# Patient Record
Sex: FEMALE | Race: WHITE | Employment: FULL TIME | URBAN - METROPOLITAN AREA
[De-identification: names, ages, dates, MRNs, and addresses within clinical notes are randomized per-mention and may not be internally consistent; named-entity substitution may affect disease eponyms.]

---

## 2017-11-30 ENCOUNTER — HOSPITAL ENCOUNTER (OUTPATIENT)
Age: 62
Setting detail: SPECIMEN
Discharge: HOME | End: 2017-11-30
Payer: COMMERCIAL

## 2017-12-04 LAB — DERMATOLOGY PATHOLOGY REPORT: NORMAL

## 2018-11-07 ENCOUNTER — HOSPITAL ENCOUNTER (OUTPATIENT)
Age: 63
Setting detail: OUTPATIENT SURGERY
Discharge: HOME | End: 2018-11-07
Attending: UROLOGY | Admitting: UROLOGY
Payer: COMMERCIAL

## 2018-11-07 VITALS
SYSTOLIC BLOOD PRESSURE: 148 MMHG | HEART RATE: 69 BPM | WEIGHT: 164 LBS | OXYGEN SATURATION: 99 % | BODY MASS INDEX: 28 KG/M2 | TEMPERATURE: 97.3 F | HEIGHT: 64 IN | DIASTOLIC BLOOD PRESSURE: 81 MMHG | RESPIRATION RATE: 20 BRPM

## 2018-11-07 PROCEDURE — 2580000003 HC RX 258: Performed by: UROLOGY

## 2018-11-07 PROCEDURE — 3600000012 HC SURGERY LEVEL 2 ADDTL 15MIN: Performed by: UROLOGY

## 2018-11-07 PROCEDURE — 2709999900 HC NON-CHARGEABLE SUPPLY: Performed by: UROLOGY

## 2018-11-07 PROCEDURE — 7100000011 HC PHASE II RECOVERY - ADDTL 15 MIN: Performed by: UROLOGY

## 2018-11-07 PROCEDURE — 7100000010 HC PHASE II RECOVERY - FIRST 15 MIN: Performed by: UROLOGY

## 2018-11-07 PROCEDURE — 3600000002 HC SURGERY LEVEL 2 BASE: Performed by: UROLOGY

## 2018-11-07 RX ORDER — MAGNESIUM HYDROXIDE 1200 MG/15ML
LIQUID ORAL CONTINUOUS PRN
Status: DISCONTINUED | OUTPATIENT
Start: 2018-11-07 | End: 2018-11-07 | Stop reason: HOSPADM

## 2018-11-07 RX ORDER — CITALOPRAM 10 MG/1
10 TABLET ORAL
COMMUNITY
Start: 2018-02-15

## 2018-11-07 RX ORDER — LORATADINE 10 MG/1
10 TABLET ORAL
COMMUNITY

## 2018-11-07 ASSESSMENT — PAIN - FUNCTIONAL ASSESSMENT: PAIN_FUNCTIONAL_ASSESSMENT: 0-10

## 2018-11-07 NOTE — OP NOTE
Dr. Yeison Phelps MD      [address] [facility]. [place]  11/07/18    Patient:  Valentino Calle  MRN: [id number]  YOB: 1955    Surgeon: Dr. Yeison Phelps MD  Assistant: Dr. Alexsandra Gamboa MD    Pre-op Diagnosis: Urinary Incontinence, Bladder Pain, History of Mid-Urethral Sling  Post-op Diagnosis: Same. No UDO, No Pelvic Floor Dysfunction, No FOREST on Cough/Valsalva. Procedure:   Complex CMG with Pressure/Flow Studies. Anesthesia: Local  Complications: None  OR Blood Loss:  Minimal  Fluids: Cystalloids  Specimens: None    Indication:  The patient is a 66year old female with history of Urinary Incontinence, Bladder Pain, and Mid-Urethral Sling placement by Dr James Kennedy. She was experiencing urinary incontinence and was unclear if it was stress or urge related. To test her bladder dynamics she was brought back to the OR today for a urodynamics study. Narrative of the Procedure: The patient was brought back to the operating room. She was sterilely prepared. A rectal, bladder, and EMG probes were placed. This point we began instilling the bladder with normal saline at a rate of 30 mL/m. The patient had a first sensation at roughly 80 mL. A secondary sensation around 130 mL. And normal sensation around 250, and a maximum capacity of around 550. Throughout the study there was no evidence of detrusor overactivity. Her detrusor pressure was completely quiet. We did ask her to cough as well as Valsalva. She was able to increase her bladder pressure to more than 100 cm of water. There is no evidence of any leakage. This was a normal filling phase study. We then asked the patient to urinate. She had difficulty initiating her urinary stream.  Once she did start it was somewhat staccato and however there was no evidence of any pelvic floor dysfunction. She was able to void with detrusor pressure. She was not voiding with Credé.   She was able to completely empty her

## 2018-11-07 NOTE — H&P
[facility], T[facility]razas[facility]   Urology H&P      Patient:  Cecilia Quintero  MRN: [id number]  YOB: 1955    CHIEF COMPLAINT:  Urinary incontinence    HISTORY OF PRESENT ILLNESS:   The patient is a 66 y.o. female who presents with urinary leakage. Here for video urodynamics. Patient's old records, notes and chart reviewed and summarized above. Past Medical History:    Past Medical History:   Diagnosis Date    Arthritis     Cancer [facility])        Past Surgical History:    Past Surgical History:   Procedure Laterality Date    HYSTERECTOMY      SKIN BIOPSY      TUBAL LIGATION         Medications:    No current facility-administered medications for this encounter. Allergies: Allergies   Allergen Reactions    Sulfa Antibiotics Hives    Codeine Nausea And Vomiting       Social History:   Social History     Social History    Marital status:      Spouse name: N/A    Number of children: N/A    Years of education: N/A     Occupational History    Not on file. Social History Main Topics    Smoking status: Former Smoker    Smokeless tobacco: Not on file    Alcohol use 1.2 oz/week     2 Shots of liquor per week      Comment: daily    Drug use: No    Sexual activity: Not on file     Other Topics Concern    Not on file     Social History Narrative    No narrative on file       Family History:  History reviewed. No pertinent family history. REVIEW OF SYSTEMS:  A comprehensive 14 point review of systems was obtained. Constitutional: No fatigue  Eyes: No blurry vision  Ears, nose, mouth, throat, face: No ringing in the ears; no facial droop. Respiratory: No cough or cold. Cardiovascular: No palpitations  Gastrointestinal: No diarrhea or constipation. Genitourinary: No burning with urination  Integument/Skin: No rashes  Hematologic/Lymphatic: No easy bruising  Musculoskeletal: No muscle pains  Neurologic: No weakness in the extremities.

## 2023-01-03 ENCOUNTER — HOSPITAL ENCOUNTER (OUTPATIENT)
Age: 68
Setting detail: SPECIMEN
Discharge: HOME | End: 2023-01-03

## 2023-01-05 LAB — DERMATOLOGY PATHOLOGY REPORT: NORMAL

## 2024-06-18 ENCOUNTER — OFFICE VISIT (OUTPATIENT)
Age: 69
End: 2024-06-18
Payer: MEDICARE

## 2024-06-18 VITALS
SYSTOLIC BLOOD PRESSURE: 136 MMHG | DIASTOLIC BLOOD PRESSURE: 88 MMHG | HEART RATE: 69 BPM | BODY MASS INDEX: 25.75 KG/M2 | WEIGHT: 150 LBS

## 2024-06-18 DIAGNOSIS — N81.9 VAGINAL VAULT PROLAPSE: ICD-10-CM

## 2024-06-18 DIAGNOSIS — N81.6 RECTOCELE: ICD-10-CM

## 2024-06-18 DIAGNOSIS — N39.41 URGE INCONTINENCE OF URINE: Primary | ICD-10-CM

## 2024-06-18 DIAGNOSIS — N95.2 POSTMENOPAUSAL ATROPHIC VAGINITIS: ICD-10-CM

## 2024-06-18 DIAGNOSIS — R39.15 URINARY URGENCY: ICD-10-CM

## 2024-06-18 DIAGNOSIS — N81.10 BADEN-WALKER GRADE 1 CYSTOCELE: ICD-10-CM

## 2024-06-18 DIAGNOSIS — N39.0 RECURRENT UTI: ICD-10-CM

## 2024-06-18 DIAGNOSIS — R33.9 RETENTION OF URINE: ICD-10-CM

## 2024-06-18 DIAGNOSIS — N39.498 OTHER URINARY INCONTINENCE: ICD-10-CM

## 2024-06-18 DIAGNOSIS — N81.89 WEAKNESS OF PELVIC FLOOR: ICD-10-CM

## 2024-06-18 LAB
BILIRUBIN, POC: NORMAL
BLOOD URINE, POC: NORMAL
CLARITY, POC: CLEAR
COLOR, POC: YELLOW
GLUCOSE URINE, POC: NORMAL
KETONES, POC: NORMAL
LEUKOCYTE EST, POC: NORMAL
NITRITE, POC: NORMAL
PH, POC: 7
POST VOID RESIDUAL (PVR): NORMAL ML
PROTEIN, POC: NORMAL
SPECIFIC GRAVITY, POC: 1.01
UROBILINOGEN, POC: NORMAL

## 2024-06-18 PROCEDURE — 1123F ACP DISCUSS/DSCN MKR DOCD: CPT

## 2024-06-18 PROCEDURE — 81003 URINALYSIS AUTO W/O SCOPE: CPT

## 2024-06-18 PROCEDURE — 99204 OFFICE O/P NEW MOD 45 MIN: CPT

## 2024-06-18 PROCEDURE — 51798 US URINE CAPACITY MEASURE: CPT

## 2024-06-18 RX ORDER — ESTRADIOL 0.1 MG/G
CREAM VAGINAL
COMMUNITY

## 2024-06-18 RX ORDER — MULTIVIT-MIN/IRON/FOLIC ACID/K 18-600-40
CAPSULE ORAL
COMMUNITY

## 2024-06-18 RX ORDER — METOPROLOL SUCCINATE 25 MG/1
1 TABLET, EXTENDED RELEASE ORAL EVERY MORNING
COMMUNITY
Start: 2019-03-15

## 2024-06-18 RX ORDER — AMLODIPINE BESYLATE 2.5 MG/1
1 TABLET ORAL EVERY MORNING
COMMUNITY
Start: 2019-03-15

## 2024-06-18 ASSESSMENT — ENCOUNTER SYMPTOMS
DIARRHEA: 0
FACIAL SWELLING: 0
COUGH: 0
CONSTIPATION: 0
SHORTNESS OF BREATH: 0
CHEST TIGHTNESS: 0
VOICE CHANGE: 0
TROUBLE SWALLOWING: 0
VOMITING: 0
RECTAL PAIN: 0
NAUSEA: 0
ABDOMINAL PAIN: 0

## 2024-06-18 NOTE — PATIENT INSTRUCTIONS
Cystocele and Rectocele   Pronounced: [id number]-[id number] or [id number]     Definition   A cystocele occurs when part of the bladder wall bulges into the vagina. The bulge happens through a defect in the tissue between the bladder and vagina.     Cystocele        2011 [employer]   A rectocele occurs when part of the wall of the rectum bulges into the vagina. The bulge happens through a defect in the tissue between the rectum and vagina.     Rectocele        2011 [employer]   These form because of a problem with pelvic support tissues (eg, fascia, ligaments, and muscle).     Reasons for Procedure   These repairs are done to stop symptoms like problems going to the bathroom, urine leakage, or pain during sex. Most often, this type of surgery is not done until other treatments have been tried. Other treatments may include muscle exercises and the insertion of a pessary device (a device put into the vagina to try to push the bladder or rectum back into place). If you have tried these treatments and experienced no relief, your doctor may suggest surgical repair.     Possible Complications   Complications are rare, but no procedure is completely free of risk. If you are planning to have this type of repair, your doctor will review a list of possible complications, which may include:   Adverse reaction to anesthesia   Infection   Bleeding   Accidental damage to vagina, rectum, and bladder   Accidental damage to nearby organs     What to Expect   Prior to Procedure   Talk to your doctor about your current medicines. Certain medicines may need to be stopped before the procedure, such as:   Aspirin or other anti-inflammatory drugs for up to one week before surgery   Blood-thinning drugs like clopidogrel (Plavix) or warfarin (Coumadin)   Eat a light meal the evening before the surgery.   Do not have anything to eat or drink after midnight on the night before the procedure.   If you are having a

## 2024-06-18 NOTE — PROGRESS NOTES
[facility], [facility] UROGYNECOLOGY AND PELVIC REHABILITATION   [address] [place] [address]  Dept: [phone]  Date: 6/18/2024  Patient Name: Rosita Arora    VISIT - NEW PATIENT VISIT     CC: had concerns including New Patient (NEW PATIENT - Bladder Issues//). had concerns including New Patient (NEW PATIENT - Bladder Issues//).    Chaperone present: None Required    HPI: This is a 69 year old patient, here to establish care and discuss bladder issues and pelvic organ prolapse. She has known IC, reporting that her IC is well controlled, she knows her triggers. Dr. Gallardo, has been treating her IC. She has also noted that she has had previous recurrent culture proven UTIs \"for years\", treated with ATBs. States that she states that she has started to use Uqora, which has helped to prevent any further UTIs. She has reported that she has had previous UDS/Cysto one year ago, along with a CT urogram for sandee hematuria. She has not gone back to see Dr. Gallardo, she would like a second opinion. She reports these tests were negative. I did review her prior CT scan, and will scan into her record.   She reports that she did think the bleeding was coming vaginally (at that time, not from her urethra); she has had a prior hysterectomy. Denies any recent vaginal bleeding - last episode of urethral or vaginal bleeding noted was more than 1 year ago. Her last colonoscopy was 3 years ago, normal per patient. However, she has noted that she has seen intermittent bright red blood when having a bowel movement. She is planning on to call and discuss with her GI.   She does feel that she has pelvic organ prolapse. She states that she has a large bulge vaginally. She is sexually active, with one male partner. No concerns for STIs, no PCB. She does report discomfort during intercourse; r/t dryness or \"something in the way\". Uses estrace cream.   Denies any history of

## 2024-06-26 ENCOUNTER — OFFICE VISIT (OUTPATIENT)
Age: 69
End: 2024-06-26
Payer: MEDICARE

## 2024-06-26 DIAGNOSIS — N94.19 DYSPAREUNIA DUE TO MEDICAL CONDITION IN FEMALE: ICD-10-CM

## 2024-06-26 DIAGNOSIS — N81.10 BADEN-WALKER GRADE 1 CYSTOCELE: ICD-10-CM

## 2024-06-26 DIAGNOSIS — R39.15 URINARY URGENCY: ICD-10-CM

## 2024-06-26 DIAGNOSIS — N81.9 VAGINAL VAULT PROLAPSE: ICD-10-CM

## 2024-06-26 DIAGNOSIS — N81.6 RECTOCELE: Primary | ICD-10-CM

## 2024-06-26 DIAGNOSIS — N81.89 WEAKNESS OF PELVIC FLOOR: ICD-10-CM

## 2024-06-26 PROCEDURE — 97530 THERAPEUTIC ACTIVITIES: CPT

## 2024-06-26 PROCEDURE — 97161 PT EVAL LOW COMPLEX 20 MIN: CPT

## 2024-06-26 NOTE — PROGRESS NOTES
[facility] UROGYNECOLOGY AND PELVIC REHABILITATION   [address] [facility]  [address] [place] [address]     Physical Therapy Pelvic Floor Evaluation    Date:  2024  Patient: Rosita Arora  : 1955  MRN: [id number]  Physician:  Elli SNYDER    Insurance: [employer] Medicare   Diagnosis: No diagnosis found.      R39.15 (ICD-10-CM) - Urinary urgency   N39.41 (ICD-10-CM) - Urge incontinence of urine   N81.89 (ICD-10-CM) - Weakness of pelvic floor   N81.6 (ICD-10-CM) - Rectocele   N81.10 (ICD-10-CM) - [place]-Walker grade 1 cystocele     Onset Date: many years ago   Next  Appt: TBD  Visit# / total visits:     Cancels/No Shows: 0    Subjective:    Pain:  [x] Yes Vaginal canal with penetration -8/10    Likes to be called Constanza     Today, Rosita reports that she does have a rectocele. She has had a long historu of bladder issues as well.     Pt notices that she will have leakage post urination. She will often find her underwear damp at times but not feel leakage.    She does not wear a pad or panty liner.     She has been going on for about 5+ years. She does have a history of IC and knows her triggers. She does feel like it is really well managed overall.     She feels like she sits on the toilet for 30 min to 45 min to have a bowel movement. She reports that stool will be loose or diarrhea like.       She reports that she is getting a rectal prolapse where she will get a bulge of rectal tissue at her rectum.       She did a colonoscopy about 2 years ago and does have a history of hemorrhoids. She was told that since December she has had 3 episodes of rectal bleeding. '    She will occasionally have to take a colace, and is straining quite often to have a bowel movement.     When she does participate in intercourse, her  will state he feels like he cannot get it in deep because something is blocking him and she reports it is very

## 2024-06-26 NOTE — PATIENT INSTRUCTIONS
Introduction to Bowel Health  Diet and daily habits can help you predict when your bowels will move on a regular basis.  The consistency and quantity of the stool is usually more important than the frequency.  The goal is to have a regular bowel movement that is soft but formed.     Tips on Emptying Regularly  Eat breakfast.  Usually the best time of day for a bowel movement will be a half hour to an hour after eating.  These times are best because the body uses the gastrocolic reflex, a stimulation of bowel motion that occurs with eating, to help produce a bowel movement.  For some people even a simple hot drink in the morning can help the reflex action begin.  Eat all your meals at a predictable time each day.  The bowel functions best when food is introduced at the same regular intervals.  The amount of food eaten at a given time of day should be about the same size from day to day.  The bowel functions best when food is introduced in similar quantities from day to day. It is fine to have a small breakfast and a large lunch, or vice versa, just be consistent.  Eat two servings of fruit or vegetables and at least one serving of a complex carbohydrates (whole grains such as brown rice, bran, whole wheat bread, or oatmeal) at each meal.  Drink plenty of water--ideally eight glasses a day.  Be sure to increase your water intake if you are increasing fiber into your diet.    Maintain Healthy Habits  Exercise daily.  You may exercise at any time of day, but you may find that bowel function is helped most if the exercise is at a consistent time each day.  Make sure that you are not rushed and have convenient access to a bathroom at your selected time to empty your bowels.     **use a squatty potty or a stool when having a bowel movement. Try not to sit for longer than 5 min.     ** Try magnesium citrate or glycinate (calm gummies)       Bowel program:    Have breakfast at same time each day.  Chew and swallow your

## 2024-07-11 ENCOUNTER — EVALUATION (OUTPATIENT)
Age: 69
End: 2024-07-11

## 2024-07-11 DIAGNOSIS — N81.9 VAGINAL VAULT PROLAPSE: ICD-10-CM

## 2024-07-11 DIAGNOSIS — N81.10 BADEN-WALKER GRADE 1 CYSTOCELE: ICD-10-CM

## 2024-07-11 DIAGNOSIS — N81.89 WEAKNESS OF PELVIC FLOOR: ICD-10-CM

## 2024-07-11 DIAGNOSIS — R39.15 URINARY URGENCY: ICD-10-CM

## 2024-07-11 DIAGNOSIS — N81.6 RECTOCELE: Primary | ICD-10-CM

## 2024-07-11 NOTE — PROGRESS NOTES
[facility], [facility] UROGYNECOLOGY AND PELVIC REHABILITATION   [facility] [facility]  [address] [place] [address]  Dept: [phone]     Date of Visit: 2024   Patient: Rosita Arora   : 1955   Referring Physician: Elli SNYDER  Insurance: aetna Medicare    Visit#:  3   Visit Diagnoses:   Diagnosis Orders   1. Rectocele        2. Weakness of pelvic floor        3. [place]-Walker grade 1 cystocele        4. Vaginal vault prolapse        5. Urinary urgency          Chaperone for Intimate Exam  Chaperone was offered as part of the rooming process. Patient declined and agrees to continue with exam without a chaperone.         Subjective:  Rosita Arora  (: 1955  is a 69 y.o.  y.o. female ,Established patient.  Pt states she is voiding every 2-4 hours during the day depending on fluids, getting up 1-2 times at night, does drink fluids before bed and when she gets up in the middle of the night as well (will eliminate fluids 2 hours before bed and during the middle of the night).  Squatty potty has helped still soft and mushy stool just started taking MG glycinate on Monday this week no changes yet.  Pt states she does have pain with initial penetration (uses uberlube as well) and pain deep.          Objective:   Tightness and tenderness noted at bilateral OI region     Tightness and tension across bilateral lower abdomen     Treatment:  Manual Therapy:  45 MIN Internal MFR, MFR/Cupping to bilateral lower abdomen   There-Act:  15 MIN  Reviewed POC and HEP.  Pt aware of after care and possible side effects of Cupping     Assessment:  Pt had moderate tightness noted bilateral OI region  with left greater than right decreased after internal MFR.  Tightness and tenderness noted across bilateral lower abdomen  decreased after MFR/cupping. Pt is aware of the after care and possible side effects of cupping as well.  Pt will continue to benefit from

## 2024-07-24 ENCOUNTER — EVALUATION (OUTPATIENT)
Age: 69
End: 2024-07-24
Payer: MEDICARE

## 2024-07-24 DIAGNOSIS — N81.89 WEAKNESS OF PELVIC FLOOR: ICD-10-CM

## 2024-07-24 DIAGNOSIS — N81.9 VAGINAL VAULT PROLAPSE: ICD-10-CM

## 2024-07-24 DIAGNOSIS — N81.10 BADEN-WALKER GRADE 1 CYSTOCELE: ICD-10-CM

## 2024-07-24 DIAGNOSIS — N94.19 DYSPAREUNIA DUE TO MEDICAL CONDITION IN FEMALE: ICD-10-CM

## 2024-07-24 DIAGNOSIS — N81.6 RECTOCELE: Primary | ICD-10-CM

## 2024-07-24 DIAGNOSIS — R39.15 URINARY URGENCY: ICD-10-CM

## 2024-07-24 PROCEDURE — 97140 MANUAL THERAPY 1/> REGIONS: CPT

## 2024-07-24 NOTE — PROGRESS NOTES
minimal to no cues in hook-lying and seated position to optimize abdominal and bowel health and to reduce pelvic floor tension to allow for easier bowel movements. progressing  Pt to demonstrate proper isolated activation of PFM to properly strengthen and coordinate muscles to improve continence. progressing  Pt to demonstrate proper relaxation of levator ani with sEMG readings below 2.5 uV to allow for complete defecation. Not met no BFB yet  Pt to demonstrate proper toileting techniques with no cueing provided by therapist to progressing  5. Pt will be independent in the performance of a home program including PFM exercises on a daily basis to increase muscle strength and recruitment as measured with digital palpation to increase continence of fecal matter.  Not met  6. Pt to be able to have intercourse with spouse with pain less than 2/10 to improve quality of life and intimacy. progressing     Patient goals:    Pt's Goal for therapy:  avoid surgery; to improve intercourse and build up strength to her PFM    Plan:  Specific Instructions for next treatment:pelvic wand and deep MFR for OI, abdominal scar tissue work, constipation and toileting including proper bowel mechanics     Treatment Charges: Mins Units   []  Modalities     []  Ther Exercise     [x]  Manual Therapy 30 2   [x]  Ther Activities     []  Aquatics     []  Vasocompression     []  Other     Total Treatment time 30 2         Time In:   12:25pm       Time Out: 1:00pm   Electronically signed by Cira Wiggins PTA on 7/24/2024 at 7:30 AM

## 2024-08-01 ENCOUNTER — TELEPHONE (OUTPATIENT)
Age: 69
End: 2024-08-01

## 2024-08-01 ENCOUNTER — HOSPITAL ENCOUNTER (OUTPATIENT)
Age: 69
Setting detail: SPECIMEN
Discharge: HOME | End: 2024-08-01

## 2024-08-01 DIAGNOSIS — R39.9 UTI SYMPTOMS: ICD-10-CM

## 2024-08-01 DIAGNOSIS — R39.9 UTI SYMPTOMS: Primary | ICD-10-CM

## 2024-08-01 RX ORDER — NITROFURANTOIN 25; 75 MG/1; MG/1
100 CAPSULE ORAL 2 TIMES DAILY
Qty: 14 CAPSULE | Refills: 0 | Status: SHIPPED | OUTPATIENT
Start: 2024-08-01 | End: 2024-08-08

## 2024-08-01 NOTE — PROGRESS NOTES
ATB sent for her to start if she is symptomatic for UTI. May need to change when final culture returns.

## 2024-08-03 LAB
MICROORGANISM SPEC CULT: NORMAL
SERVICE CMNT-IMP: NORMAL
SPECIMEN DESCRIPTION: NORMAL

## 2024-08-27 NOTE — PROGRESS NOTES
Blood, UA POC 50     pH, UA 6     Protein, UA POC neg     Urobilinogen, UA norm     Leukocytes, UA neg     Nitrite, UA neg         ASSESSMENT/PLAN:    Urinary tract infection with hematuria, site unspecified  -     POCT Urinalysis No Micro (Auto)  Microscopic hematuria     She reports longstanding history of microscopic hematuria. She reports that she had a work up last year.   Recommend to follow up as scheduled for her annual exam.     The patient was counseled regarding review of all conditions discussed.     2.   Educational handouts were discussed & given when applicable.     3.   Testing recommendations were given as indicated.     4.   Detailed questionnaires regarding the patient's specific condition were given to the patient when indicated. The patient understands that these are her responsibility to complete and return on a voluntary basis. Reminders to complete the questionnaires will not be given. The patient understands that failure to return the questionnaires may not give the provider a full picture of the patient's urogynecologic issues and make treatment less than optimal despite the practitioner's best effort to obtain information.     Multiple records reviewed. All questions were addressed to the patient's satisfaction.  Additional time (minutes) spent regarding today's visit:  15 Performing: ACVISITTIMEPERFORM: Pre-visit chart review and note construction, Extensive counseling, and Covering additional health topics on top of those listed in the chief complaint    Point of care: Medical decision making and point of care discussed with the team & supervising physician to qualify as a shared visit when applicable, supervising physician by proxy provider    Follow up: Return if symptoms worsen or fail to improve, for Next scheduled appointment for annual .     Electronically signed by LILLIAN Cunningham CNP on 8/28/2024 at 2:24 PM    EMR / Voice Dictation Disclaimer: - This note is created with the

## 2024-08-28 ENCOUNTER — OFFICE VISIT (OUTPATIENT)
Age: 69
End: 2024-08-28
Payer: MEDICARE

## 2024-08-28 ENCOUNTER — EVALUATION (OUTPATIENT)
Age: 69
End: 2024-08-28
Payer: MEDICARE

## 2024-08-28 VITALS
OXYGEN SATURATION: 98 % | HEART RATE: 87 BPM | SYSTOLIC BLOOD PRESSURE: 108 MMHG | TEMPERATURE: 97.7 F | DIASTOLIC BLOOD PRESSURE: 82 MMHG

## 2024-08-28 DIAGNOSIS — N81.10 BADEN-WALKER GRADE 1 CYSTOCELE: ICD-10-CM

## 2024-08-28 DIAGNOSIS — N81.6 RECTOCELE: Primary | ICD-10-CM

## 2024-08-28 DIAGNOSIS — R39.15 URINARY URGENCY: ICD-10-CM

## 2024-08-28 DIAGNOSIS — N39.0 URINARY TRACT INFECTION WITH HEMATURIA, SITE UNSPECIFIED: Primary | ICD-10-CM

## 2024-08-28 DIAGNOSIS — N81.9 VAGINAL VAULT PROLAPSE: ICD-10-CM

## 2024-08-28 DIAGNOSIS — R31.9 URINARY TRACT INFECTION WITH HEMATURIA, SITE UNSPECIFIED: Primary | ICD-10-CM

## 2024-08-28 DIAGNOSIS — N94.19 DYSPAREUNIA DUE TO MEDICAL CONDITION IN FEMALE: ICD-10-CM

## 2024-08-28 DIAGNOSIS — R31.29 MICROSCOPIC HEMATURIA: ICD-10-CM

## 2024-08-28 DIAGNOSIS — N81.89 WEAKNESS OF PELVIC FLOOR: ICD-10-CM

## 2024-08-28 LAB
BILIRUBIN, POC: NORMAL
BLOOD URINE, POC: 50
CLARITY, POC: CLEAR
COLOR, POC: YELLOW
GLUCOSE URINE, POC: NORMAL
KETONES, POC: NORMAL
LEUKOCYTE EST, POC: NORMAL
NITRITE, POC: NORMAL
PH, POC: 6
PROTEIN, POC: NORMAL
SPECIFIC GRAVITY, POC: 1.01
UROBILINOGEN, POC: NORMAL

## 2024-08-28 PROCEDURE — 81003 URINALYSIS AUTO W/O SCOPE: CPT

## 2024-08-28 PROCEDURE — 97112 NEUROMUSCULAR REEDUCATION: CPT

## 2024-08-28 PROCEDURE — 1123F ACP DISCUSS/DSCN MKR DOCD: CPT

## 2024-08-28 PROCEDURE — 99212 OFFICE O/P EST SF 10 MIN: CPT

## 2024-08-28 PROCEDURE — 97140 MANUAL THERAPY 1/> REGIONS: CPT

## 2024-08-28 NOTE — PROGRESS NOTES
[facility], [facility] UROGYNECOLOGY AND PELVIC REHABILITATION   [address] [place] [address]  [address] [place] [address]  Dept: [phone]     Date of Visit: 2024   Patient: Rosita Arora   : 1955   Referring Physician: Elli SNYDER  Insurance: aetna Medicare    Visit#:  4  Visit Diagnoses:        Diagnosis Orders   1. Rectocele        2. Weakness of pelvic floor        3. [place]-Walker grade 1 cystocele        4. Vaginal vault prolapse        5. Urinary urgency        6. Dyspareunia due to medical condition in female                Subjective:  Rosita Arora  (: 1955  is a 69 y.o.  y.o. female ,Pt states she did not have a UTI last visit but was still given antibiotic to take and it did help her feel better, possibly and IC Flare does not take anything for IC currently.  Did not have intercourse since I saw her last she was OOT and then her  was OOT.  Pt did bring in wand today to be educated on use of.  Pt states she is still taking MG and thinks it is helping some still has to occasionally strain to go.        Objective:   Tightness and tenderness noted at bilateral OI region left 1-2nd layer and right layer deep anterior     Educated on use of dilator and demonstrated good knowledge of how to use at home and able to find tightness and trigger spots.          Treatment:  Manual Therapy:  30 MIN Internal MFR, MFR to bilateral lower abdomen   NEURO   15min educated on use of wand for home    Assessment:  Pt had moderate tightness noted bilateral OI region  with left greater than right  left first and 2nd layers and right anterior deep decreased after internal MFR.  Pt educated on intimate parvin wand and demonstrated good knowledge of use of for HEP Pt will continue to benefit from skilled PT to increase mobility and decrease pain      LTG ( to be met in 12  treatments):    Pt to demonstrate proper diaphragmatic breathe with minimal to

## 2024-09-27 ENCOUNTER — EVALUATION (OUTPATIENT)
Age: 69
End: 2024-09-27
Payer: MEDICARE

## 2024-09-27 DIAGNOSIS — R39.15 URINARY URGENCY: ICD-10-CM

## 2024-09-27 DIAGNOSIS — N94.19 DYSPAREUNIA DUE TO MEDICAL CONDITION IN FEMALE: ICD-10-CM

## 2024-09-27 DIAGNOSIS — N81.10 BADEN-WALKER GRADE 1 CYSTOCELE: ICD-10-CM

## 2024-09-27 DIAGNOSIS — N81.6 RECTOCELE: Primary | ICD-10-CM

## 2024-09-27 DIAGNOSIS — N81.89 WEAKNESS OF PELVIC FLOOR: ICD-10-CM

## 2024-09-27 PROCEDURE — 97530 THERAPEUTIC ACTIVITIES: CPT | Performed by: PHYSICAL THERAPIST

## 2024-10-17 NOTE — PROGRESS NOTES
Gynecology reviewed including gravity/parity, #'s, perineal delivery trauma, LMP, days of flow, heaviest days, # pads / tampons per day, cramps, abdominopelvic pain, anemia, discharge, & prior STI's.    Pap smear: No cervical cancer screening on file  Mammogram: Date of last Mammogram: 2024   DEXA Result (most recent):   DEXA BODY COMPOSITION ANALYSIS 2024    Narrative  DEXA SCAN CENTRAL SKELETAL    CLINICAL HISTORY:  Osteoporosis screening. .    COMPARISON: 2022    Dual X-ray Absorptiometry (DEXA) was performed.    LUMBAR SPINE (L1-L4): Bone density is 1.011 gm/cm2.  T-score is -1.4.  Z-score is 0.2.    LEFT FEMORAL NECK: Bone density is 0.750 gm/cm2.  T-score is -2.1.  Z-score is -0.5.    RIGHT FEMORAL NECK: Bone density is 0.755 gm/cm2.  T-score is -2.0.  Z-score is -0.5.      If this patient is not currently on therapy, the 10-year probability for a major osteoporotic fracture (utilizing FRAX) is 11.8% and for a hip fracture is 2.1%. (According to WHO, therapy is indicated if 10-year risk for major osteoporotic fracture is greater than or equal to 20% or the 10-risk for  a hip fracture is greater than or equal to 3%.)      IMPRESSION:  1. Osteopenia bone mineral density by the WHO criteria (using T-scores).      WORLD HEALTH ORGANIZATION (WHO) GUIDELINES:  *  T-score compares patient BMD to a reference of young normal controls.  *  Z-score compares patient BMD to age, gender, and race matched controls.    T-score of -1 or greater = Normal  T-score less than -1.0 but greater than -2.5 = Osteopenia  T-score of -2.5 or less = Osteoporosis    In patients, who are osteopenic or osteoporotic, hormonal therapy, other medical therapy, dietary supplementation, and weight bearing exercise may prevent further bone mineral loss. Repeat DEXA testing may be indicated but is based on individual patient clinical characteristics.  The current national osteoporosis Foundation guide recommends treating patients

## 2024-10-18 ENCOUNTER — OFFICE VISIT (OUTPATIENT)
Age: 69
End: 2024-10-18

## 2024-10-18 VITALS
WEIGHT: 150 LBS | OXYGEN SATURATION: 100 % | SYSTOLIC BLOOD PRESSURE: 136 MMHG | HEART RATE: 67 BPM | TEMPERATURE: 97.2 F | BODY MASS INDEX: 25.75 KG/M2 | DIASTOLIC BLOOD PRESSURE: 76 MMHG

## 2024-10-18 DIAGNOSIS — Z12.31 ENCOUNTER FOR SCREENING MAMMOGRAM FOR MALIGNANT NEOPLASM OF BREAST: ICD-10-CM

## 2024-10-18 DIAGNOSIS — Z01.419 WELL WOMAN EXAM WITH ROUTINE GYNECOLOGICAL EXAM: Primary | ICD-10-CM

## 2024-10-18 DIAGNOSIS — Z78.0 MENOPAUSE: ICD-10-CM

## 2024-10-18 DIAGNOSIS — K64.9 HEMORRHOIDS, UNSPECIFIED HEMORRHOID TYPE: ICD-10-CM

## 2024-10-18 DIAGNOSIS — N64.4 MASTALGIA IN FEMALE: ICD-10-CM

## 2024-10-18 RX ORDER — CLOBETASOL PROPIONATE 0.5 MG/G
CREAM TOPICAL
Qty: 30 G | Refills: 1 | Status: SHIPPED | OUTPATIENT
Start: 2024-10-18

## 2024-11-11 DIAGNOSIS — Z12.31 ENCOUNTER FOR SCREENING MAMMOGRAM FOR MALIGNANT NEOPLASM OF BREAST: ICD-10-CM

## 2025-01-20 ENCOUNTER — TELEPHONE (OUTPATIENT)
Age: 70
End: 2025-01-20

## 2025-01-20 ENCOUNTER — EVALUATION (OUTPATIENT)
Age: 70
End: 2025-01-20
Payer: COMMERCIAL

## 2025-01-20 DIAGNOSIS — N81.89 WEAKNESS OF PELVIC FLOOR: ICD-10-CM

## 2025-01-20 DIAGNOSIS — N81.10 BADEN-WALKER GRADE 1 CYSTOCELE: ICD-10-CM

## 2025-01-20 DIAGNOSIS — N94.19 DYSPAREUNIA DUE TO MEDICAL CONDITION IN FEMALE: Primary | ICD-10-CM

## 2025-01-20 DIAGNOSIS — N81.6 RECTOCELE: ICD-10-CM

## 2025-01-20 DIAGNOSIS — R39.15 URINARY URGENCY: ICD-10-CM

## 2025-01-20 PROCEDURE — 97530 THERAPEUTIC ACTIVITIES: CPT | Performed by: PHYSICAL THERAPIST

## 2025-01-20 PROCEDURE — 97164 PT RE-EVAL EST PLAN CARE: CPT | Performed by: PHYSICAL THERAPIST

## 2025-01-20 NOTE — TELEPHONE ENCOUNTER
It doesn't look like we discussed it too much at her last visit. I would recommend a follow up first.

## 2025-01-20 NOTE — PATIENT INSTRUCTIONS
Continue POP relief positions, continue pelvic floor strengthening.  Educated on relieve and revive.  Follow-up with Dr. Reddy and  Elli, CNP

## 2025-01-20 NOTE — PROGRESS NOTES
Physical Therapy Treatment Note   [facility], [facility] UROGYNECOLOGY AND PELVIC REHABILITATION   [address] [place] [address]  [address] [place] [address]  Dept: [phone]     Visit # Re evaluation    Patient: Rosita Arora  : 1955   DOS: 2025  Referring Physician:   ARIELLE Arellano     Time In: 0800  Time Out: 0833  Total Time (min): 33    Treatment:  Treatment Charges Mins Units   [] Manual Therapy (75208)     [x] Therapeutic Activity (75507) 13 1   [] Self Care/Home Management Training (74285)     [] Therapeutic Exercise (15949)     [] Neuromuscular William (56638)     [] Gait Training (62621)     [] PT-Eval (53189, 15247, 36754)     [x] PT Re-Eval (95022) 20 1   [] Caregiver Training (37600)     Total Treatment Time: 33 2     Subjective:  Pt's primary c/o:   Chief Complaint   Patient presents with    Pelvic Pain        Diagnosis:   Diagnosis Orders   1. Dyspareunia due to medical condition in female        2. Weakness of pelvic floor        3. [place]-Walker grade 1 cystocele        4. Urinary urgency        5. Rectocele             Pt reports:  compliance with home program  She reports she performs her pelvic organ prolapse relief positions for decreasing creasing pressure in her pelvic floor, however prolapse pressure feeling is occurring a little bit more often.  She is doing her Kegels with multiple times throughout the day.  Vaginal bulge pressure is worse in standing.  She still also has difficulty with bowel movements and urinating due to pressure in the vaginal area.  She feels she is ready for surgical consult.    Objective:    Therapeutic exercise:  Reviewed shush push and pelvic floor contraction technique.  Patient is independent.  She does better in pelvic organ prolapse relief positions.  Shown Revive disposable pessary and Releve device for vaginal balloon which helps with ease of defecation and urination.       Patient education:  Pt given home

## 2025-01-20 NOTE — TELEPHONE ENCOUNTER
Pt was seen for annual in Oct, has been doing PFT, now ready to consider surgical option for prolapse, okay to set up for UDS and cysto or would she need an office visit to discuss first?

## 2025-02-11 ENCOUNTER — OFFICE VISIT (OUTPATIENT)
Age: 70
End: 2025-02-11
Payer: COMMERCIAL

## 2025-02-11 VITALS — HEART RATE: 71 BPM | SYSTOLIC BLOOD PRESSURE: 130 MMHG | DIASTOLIC BLOOD PRESSURE: 78 MMHG | OXYGEN SATURATION: 90 %

## 2025-02-11 DIAGNOSIS — N81.6 RECTOCELE: ICD-10-CM

## 2025-02-11 DIAGNOSIS — N81.10 BADEN-WALKER GRADE 1 CYSTOCELE: Primary | ICD-10-CM

## 2025-02-11 DIAGNOSIS — N39.41 URGE INCONTINENCE OF URINE: ICD-10-CM

## 2025-02-11 DIAGNOSIS — R39.15 URINARY URGENCY: ICD-10-CM

## 2025-02-11 DIAGNOSIS — N99.3 VAGINAL VAULT PROLAPSE AFTER HYSTERECTOMY: ICD-10-CM

## 2025-02-11 PROCEDURE — 1160F RVW MEDS BY RX/DR IN RCRD: CPT

## 2025-02-11 PROCEDURE — 1159F MED LIST DOCD IN RCRD: CPT

## 2025-02-11 PROCEDURE — 99213 OFFICE O/P EST LOW 20 MIN: CPT

## 2025-02-11 PROCEDURE — 1123F ACP DISCUSS/DSCN MKR DOCD: CPT

## 2025-02-11 NOTE — PROGRESS NOTES
Musculoskeletal:         General: Normal range of motion.      Cervical back: Normal range of motion and neck supple.   Neurological:      Mental Status: She is oriented to person, place, and time.   Psychiatric:         Mood and Affect: Mood normal.         Behavior: Behavior normal.   Vitals and nursing note reviewed.         ASSESSMENT/PLAN:    [place] grade 1 cystocele  -     Cystoscopy; Future  Urge incontinence of urine  -     Cystoscopy; Future  Rectocele  -     Cystoscopy; Future  Vaginal vault prolapse after hysterectomy  -     Cystoscopy; Future  Urinary urgency  -     Cystoscopy; Future       The patient was counseled regarding review of all conditions discussed.        2.   Educational handouts were discussed & given when applicable.     3.   Testing recommendations were given as indicated.     4.   Detailed questionnaires regarding the patient's specific condition were given to the patient when indicated. The patient understands that these are her responsibility to complete and return on a voluntary basis. Reminders to complete the questionnaires will not be given. The patient understands that failure to return the questionnaires may not give the provider a full picture of the patient's urogynecologic issues and make treatment less than optimal despite the practitioner's best effort to obtain information.     Multiple records reviewed. All questions were addressed to the patient's satisfaction.    Additional procedural time (minutes) spent regarding today's visit:  20 Performing: ACVISITTIMEPERFORM: Pre-visit chart review and note construction, Extensive counseling, and Covering additional health topics on top of those listed in the chief complaint      Point of care: Medical decision making and point of care discussed with the team & supervising physician to qualify as a shared visit when applicable, supervising physician by proxy provider    Follow up: Return for UDS / Cysto .

## 2025-02-28 DIAGNOSIS — R92.8 ABNORMAL MAMMOGRAM OF LEFT BREAST: Primary | ICD-10-CM

## 2025-02-28 DIAGNOSIS — N64.4 MASTALGIA IN FEMALE: ICD-10-CM

## 2025-03-03 NOTE — PROGRESS NOTES
with anterior posterior repairs.  If the patient does leak under anesthesia would recommend a transurethral bulking injection.  She still has ovaries and I would recommend removal.    2.   Educational handouts were discussed & given when applicable.       Multiple records reviewed. All questions were addressed to the patient's satisfaction.    Additional procedural time (minutes) spent regarding today's visit:  20 Performing: DANYELLVISDALLASIMEPERFORM: Pre-visit chart review and note construction  Additional counseling time (minutes) spent regarding today's visit:  30 Performing: AYSEPERFORM: Pre-visit chart review and note construction, Extensive counseling, Covering additional health topics on top of those listed in the chief complaint, and Additional testing and or procedures      Point of care: The treating physician provided 100% of care and consultation for this encounter.    Follow up: No follow-ups on file.     Electronically signed by Steven Reddy DO on 3/3/2025 at 11:25 AM

## 2025-03-04 ENCOUNTER — PROCEDURE VISIT (OUTPATIENT)
Age: 70
End: 2025-03-04
Payer: COMMERCIAL

## 2025-03-04 VITALS — DIASTOLIC BLOOD PRESSURE: 82 MMHG | HEART RATE: 71 BPM | SYSTOLIC BLOOD PRESSURE: 142 MMHG | OXYGEN SATURATION: 97 %

## 2025-03-04 DIAGNOSIS — N81.89 WEAKNESS OF PELVIC FLOOR: ICD-10-CM

## 2025-03-04 DIAGNOSIS — N99.3 VAGINAL VAULT PROLAPSE AFTER HYSTERECTOMY: ICD-10-CM

## 2025-03-04 DIAGNOSIS — Z41.9 ELECTIVE SURGERY: ICD-10-CM

## 2025-03-04 DIAGNOSIS — N81.6 RECTOCELE: ICD-10-CM

## 2025-03-04 DIAGNOSIS — N81.10 BADEN-WALKER GRADE 1 CYSTOCELE: Primary | ICD-10-CM

## 2025-03-04 DIAGNOSIS — Z78.0 MENOPAUSE: ICD-10-CM

## 2025-03-04 DIAGNOSIS — N39.41 URGE INCONTINENCE OF URINE: ICD-10-CM

## 2025-03-04 DIAGNOSIS — R39.15 URINARY URGENCY: ICD-10-CM

## 2025-03-04 PROCEDURE — 52000 CYSTOURETHROSCOPY: CPT | Performed by: OBSTETRICS & GYNECOLOGY

## 2025-03-04 PROCEDURE — 1123F ACP DISCUSS/DSCN MKR DOCD: CPT | Performed by: OBSTETRICS & GYNECOLOGY

## 2025-03-04 PROCEDURE — 81003 URINALYSIS AUTO W/O SCOPE: CPT | Performed by: OBSTETRICS & GYNECOLOGY

## 2025-03-04 PROCEDURE — 1160F RVW MEDS BY RX/DR IN RCRD: CPT | Performed by: OBSTETRICS & GYNECOLOGY

## 2025-03-04 PROCEDURE — 51725 SIMPLE CYSTOMETROGRAM: CPT | Performed by: OBSTETRICS & GYNECOLOGY

## 2025-03-04 PROCEDURE — 99214 OFFICE O/P EST MOD 30 MIN: CPT | Performed by: OBSTETRICS & GYNECOLOGY

## 2025-03-04 PROCEDURE — 1159F MED LIST DOCD IN RCRD: CPT | Performed by: OBSTETRICS & GYNECOLOGY

## 2025-03-04 RX ORDER — LANOLIN ALCOHOL/MO/W.PET/CERES
CREAM (GRAM) TOPICAL
COMMUNITY

## 2025-03-04 NOTE — PATIENT INSTRUCTIONS
activity.  *Elevate your pelvis when reclining to allow venous engorgement to reduce.  *Resume physical activities slowly; take showers instead of baths for 4-6 weeks.  *To avoid constipation eat fruits, vegetables & whole-grain foods.  Drink 8 glasses of fluid daily.  *You may drive after 5-7 days if you feel up to it, have discontinued narcotic pain meds, and can press on the brake quickly without pain.  *Do not lift more than 15 lbs. until after your 1-week appointment; when you can return to work depends on your responsibilities.  *You will be seen in the office at 1-2 weeks postoperatively, and as needed.  *Please call the office with any questions or concerns at [phone].      *Notify your Dr. if you notice fever or chills, heavy vaginal bleeding or foul vaginal discharge, redness, bleeding or discharge at the incision site, pain or swelling in your legs, shortness of breath or chest pain, severe abdominal or pelvic pain.        REFERENCES    Surgical management of pelvic organ prolapse in women - [place] [facility] July 26, 2023    [facility] Patient Education FAQ, Surgery for Pelvic Organ Prolapse: http://www.acog.org/Patients/FAQs/Surgery-for-Pelvic-Organ-Prolapse     [facility] video, Pelvic Organ Prolapse: http://www.[facility]og.org/Patients/Patient-Education-Videos/Pelvic-Organ-Prolapse         The above information is an educational tool only.  It is not intended as medical advice for individual conditions or treatments.  Talk to your healthcare provider before following any medical regimen to see if it is safe and effective for you.

## 2025-03-07 ENCOUNTER — PREP FOR PROCEDURE (OUTPATIENT)
Age: 70
End: 2025-03-07

## 2025-03-07 DIAGNOSIS — N81.10 FEMALE CYSTOCELE: ICD-10-CM

## 2025-03-07 DIAGNOSIS — N81.6 RECTOCELE: ICD-10-CM

## 2025-03-07 DIAGNOSIS — N39.41 URGE INCONTINENCE: ICD-10-CM

## 2025-03-07 DIAGNOSIS — Z01.818 PRE-OP TESTING: Primary | ICD-10-CM

## 2025-03-07 DIAGNOSIS — Z78.0 POSTMENOPAUSAL: ICD-10-CM

## 2025-03-07 DIAGNOSIS — N81.9 VAGINAL VAULT PROLAPSE: ICD-10-CM

## 2025-03-14 ENCOUNTER — RESULTS FOLLOW-UP (OUTPATIENT)
Age: 70
End: 2025-03-14

## 2025-03-14 DIAGNOSIS — R92.8 ABNORMAL MAMMOGRAM OF LEFT BREAST: ICD-10-CM

## 2025-03-14 DIAGNOSIS — R92.8 ABNORMAL MAMMOGRAM OF LEFT BREAST: Primary | ICD-10-CM

## 2025-03-20 ENCOUNTER — RESULTS FOLLOW-UP (OUTPATIENT)
Age: 70
End: 2025-03-20

## 2025-03-20 DIAGNOSIS — R92.8 ABNORMAL MAMMOGRAM OF LEFT BREAST: ICD-10-CM

## 2025-03-20 PROCEDURE — 19083 BX BREAST 1ST LESION US IMAG: CPT

## 2025-03-24 ENCOUNTER — TELEPHONE (OUTPATIENT)
Age: 70
End: 2025-03-24

## 2025-03-25 ENCOUNTER — TELEPHONE (OUTPATIENT)
Age: 70
End: 2025-03-25

## 2025-03-25 DIAGNOSIS — C50.919 INVASIVE LOBULAR CARCINOMA OF BREAST IN FEMALE: Primary | ICD-10-CM

## 2025-03-25 NOTE — TELEPHONE ENCOUNTER
Referral placed to Dr. Christen Conklin - [facility] for breast cancer diagnosis. Discussed result with patient.

## 2025-03-27 ENCOUNTER — PATIENT MESSAGE (OUTPATIENT)
Age: 70
End: 2025-03-27

## 2025-03-27 DIAGNOSIS — N64.4 MASTALGIA IN FEMALE: ICD-10-CM

## 2025-03-28 NOTE — TELEPHONE ENCOUNTER
Please see the referral that I already placed to [facility] for her new breast cancer diagnosis. Please call then and ensure they got it.

## 2025-03-28 NOTE — TELEPHONE ENCOUNTER
Called [facility], left them a message to please let us know if they received referral.

## 2025-08-04 DIAGNOSIS — R39.9 UTI SYMPTOMS: Primary | ICD-10-CM

## 2025-08-04 RX ORDER — NITROFURANTOIN 25; 75 MG/1; MG/1
100 CAPSULE ORAL 2 TIMES DAILY
Qty: 14 CAPSULE | Refills: 0 | Status: SHIPPED | OUTPATIENT
Start: 2025-08-04 | End: 2025-08-11

## 2025-08-27 DIAGNOSIS — Z01.818 PRE-OP TESTING: Primary | ICD-10-CM

## 2025-08-27 PROBLEM — N99.3 VAGINAL VAULT PROLAPSE AFTER HYSTERECTOMY: Status: ACTIVE | Noted: 2025-08-27

## 2025-08-27 PROBLEM — R39.15 URGENCY OF URINATION: Status: ACTIVE | Noted: 2025-08-27

## (undated) DEVICE — ELECTRODE EMG GEL PTCH W/ WIRE NEOTRODE II URODYN

## (undated) DEVICE — CATHETER URETH BLLN 5CC 12FR SIL ALLY AND HYDRGEL F INF

## (undated) DEVICE — Device: Brand: AIR-CHARGED SINGLE SENSOR CATHETER

## (undated) DEVICE — CATHETER,URETHRAL,REDRUBBER,STRL,14FR: Brand: MEDLINE INDUSTRIES, INC.

## (undated) DEVICE — Z DISCONTINUED BY MEDLINE USE 2711682 TRAY SKIN PREP DRY W/ PREM GLV

## (undated) DEVICE — CATHETER URODYN AIR CHARGED ABD SENSOR

## (undated) DEVICE — GLOVE ORANGE PI 7 1/2   MSG9075

## (undated) DEVICE — Device: Brand: PUMP TUBING INFUSION LINE

## (undated) DEVICE — Z DUP USE 2522782 SOLUTION IRRIG 1000ML STRL H2O PLAS CONTAINER UROMATIC

## (undated) DEVICE — PROTECTOR ULN NRV PUR FOAM HK LOOP STRP ANATOMICALLY